# Patient Record
(demographics unavailable — no encounter records)

---

## 2025-01-10 NOTE — DISCUSSION/SUMMARY
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   Misha Montaño MD [facility] BRITTA KOTHARI Director, Preventive Cardiology & Lipidology [facility] Cardiovascular [place]                                                                                                                                                                                                                                                                     --                                                                                                                                                                                                                                                                                                                                                                                                                                                                                                                                                                                                                                                                                                                            ----------- Additional 30 minutes was provided for preventive counseling on healthy diet, weight maintenance, CV risk reduction. Specifically, and separate from other cardiovascular evaluation and treatment, we discussed JOSIAH 's willingness to focus  on lifestyle changes, particularly dietary; including greater consumption of vegetables, fruits over saturated fats. We discussed appropriate follow up to monitor progress on these areas. We also discussed the importance of weight control to reduce any exacerbation of underlying conditions.

## 2025-01-10 NOTE — HISTORY OF PRESENT ILLNESS
[FreeTextEntry1] : Dear Artem,   I had the pleasure of seeing your patient JOSIAH DELGADO for Cardiometabolic evaluation.   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

## 2025-03-17 NOTE — DISCUSSION/SUMMARY
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   Misha Montaño MD [facility] BRITTA KOTHARI Director, Preventive Cardiology & Lipidology [facility] Cardiovascular [place]                                                                                                                                                                                                                                                                     --                                                                                                                                                                                                                                                                                                                                                                                                                                                                                                                                                                                                                                                                                                                            ----------- Additional 30 minutes was provided for preventive counseling on healthy diet, weight maintenance, CV risk reduction. Specifically, and separate from other cardiovascular evaluation and treatment, we discussed JOSIAH 's willingness to focus  on lifestyle changes, particularly dietary; including greater consumption of vegetables, fruits over saturated fats. We discussed appropriate follow up to monitor progress on these areas. We also discussed the importance of weight control to reduce any exacerbation of underlying conditions.

## 2025-03-17 NOTE — HISTORY OF PRESENT ILLNESS
[FreeTextEntry1] : Dear Artem,   I had the pleasure of seeing your patient JOSIAH DELGADO for Cardiometabolic evaluation.   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

## 2025-03-18 NOTE — ASSESSMENT
[FreeTextEntry1] : plan ---chronic stable HIV and chronic stable lipids 1) Continue Biktarvy. -25mg oral tab daily and Pravastatin 20mg oral tab daily . and Vit D 1000mcg oral tab daily ( all meds reviewed and renewed) 2) GI referral for Fam Hx of colon ca. 3) see in 6 months, 4) all previous labs reviewed today and new labs including cbc, bmp, tsh, a1c, urine viral load and cd4 5) external provider notes reviewed 6) continue follow up with Dr Raymon Montaño in cardiology and pulmonary for nodule [Treatment Education] : treatment education [Treatment Adherence] : treatment adherence [Universal Precautions] : universal precautions [Anticipatory Guidance] : anticipatory guidance

## 2025-03-18 NOTE — HISTORY OF PRESENT ILLNESS
[FreeTextEntry1] : Reason For Visit---chronic stable HIV and chronic stable lipids JOSIAH DELGADO is a pleasant 30 year old male being seen for a follow-up visit, HIV stable. Switched from  Genvoya to Biktarvy for HIV management . 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 On feb 19th 2022 he Was in [facility] in [place] for right lung PE. now taking eliquis 5mg BID for 7 days ...interaction with Genvoya and  switched to Biktarvy today . He has follow up pulmonary at [facility] on March 3rd with Dr. Scott Kenney. Taking Pravastatin 20mg daily ...may need to be increased---family Hx of elevated cholesterol and diabetes. on sept 27th, 2020 was at [facility] in [place] had chest CT and 5mm nodule right middle lobe found. Pulmonary at [facility] states 5mm nodule gone. and Eliquis was discontinued by hematologist dr. Braun no new sex partners. Had flu vaccine at work had both doses of Pfizer COVID vaccine and planning on the booster. non smoking, social drinking. Fathers both had colon cancer. GI referral for family Hx of colon cancer. Review of Systems  Constitutional, Eyes, ENT, Cardiovascular, Respiratory, Gastrointestinal, Genitourinary, Musculoskeletal, Integumentary, Neurological, Psychiatric and Heme/Lymph are otherwise negative.  plan ---chronic stable HIV and chronic stable lipids 1) Continue Biktarvy. -25mg oral tab daily and Pravastatin 20mg oral tab daily . and Vit D 1000mcg oral tab daily ( all meds reviewed and renewed) 2) GI referral for Fam Hx of colon ca. 3) see in 6 months, 4) all previous labs reviewed today and new labs including cbc, bmp, tsh, a1c, urine viral load and cd4 5) external provider notes reviewed 6) continue follow up with Dr Raymon Montaño in cardiology and pulmonary for nodule     Pt started Genvoya in June 2017  24 year old male. just diagnosed in clinic in [facility], HIV. a friend was getting tested. MSM. single.  medical history: HIV, no other issues. Surgeries: none. Both parents alive: mother diabetes , HTN, 'father HTN; Has regular Dr. Emanuel Urbina in [facility] in [place]. [phone].. Uses [facility] in [facility].      Sexual History: The patient is not sexually active. The patient is for some encounters. The patient has intercourse with men. MSM  STI, Dates, Treatment: none  Travel: traveler,.  Occupation: work and school. .  Partner Status: none.  Lives with parents.  Who is aware of HIV status: mom and step dad, uncle and best friend.    Past Medical History  none    Surgical History  none    Social History  none smoker, drink occasionally    Current Meds  was on Genvoya, started 6/5/2017, now on Biktarvy  pravastatin,  vit D  eliquis    Allergies  none